# Patient Record
Sex: FEMALE | Race: WHITE | NOT HISPANIC OR LATINO
[De-identification: names, ages, dates, MRNs, and addresses within clinical notes are randomized per-mention and may not be internally consistent; named-entity substitution may affect disease eponyms.]

---

## 2019-08-19 ENCOUNTER — APPOINTMENT (OUTPATIENT)
Dept: MAMMOGRAPHY | Facility: IMAGING CENTER | Age: 44
End: 2019-08-19
Payer: COMMERCIAL

## 2019-08-19 ENCOUNTER — OUTPATIENT (OUTPATIENT)
Dept: OUTPATIENT SERVICES | Facility: HOSPITAL | Age: 44
LOS: 1 days | End: 2019-08-19
Payer: COMMERCIAL

## 2019-08-19 DIAGNOSIS — Z00.8 ENCOUNTER FOR OTHER GENERAL EXAMINATION: ICD-10-CM

## 2019-08-19 PROCEDURE — 77067 SCR MAMMO BI INCL CAD: CPT | Mod: 26

## 2019-08-19 PROCEDURE — 77067 SCR MAMMO BI INCL CAD: CPT

## 2019-08-19 PROCEDURE — 77063 BREAST TOMOSYNTHESIS BI: CPT

## 2019-08-19 PROCEDURE — 77063 BREAST TOMOSYNTHESIS BI: CPT | Mod: 26

## 2021-12-18 ENCOUNTER — TRANSCRIPTION ENCOUNTER (OUTPATIENT)
Age: 46
End: 2021-12-18

## 2022-09-28 ENCOUNTER — APPOINTMENT (OUTPATIENT)
Dept: MAMMOGRAPHY | Facility: CLINIC | Age: 47
End: 2022-09-28

## 2022-09-28 ENCOUNTER — OUTPATIENT (OUTPATIENT)
Dept: OUTPATIENT SERVICES | Facility: HOSPITAL | Age: 47
LOS: 1 days | End: 2022-09-28
Payer: COMMERCIAL

## 2022-09-28 DIAGNOSIS — Z00.00 ENCOUNTER FOR GENERAL ADULT MEDICAL EXAMINATION WITHOUT ABNORMAL FINDINGS: ICD-10-CM

## 2022-09-28 PROCEDURE — 77067 SCR MAMMO BI INCL CAD: CPT | Mod: 26

## 2022-09-28 PROCEDURE — 77063 BREAST TOMOSYNTHESIS BI: CPT | Mod: 26

## 2022-09-28 PROCEDURE — 77063 BREAST TOMOSYNTHESIS BI: CPT

## 2022-09-28 PROCEDURE — 77067 SCR MAMMO BI INCL CAD: CPT

## 2023-01-16 ENCOUNTER — APPOINTMENT (OUTPATIENT)
Dept: ULTRASOUND IMAGING | Facility: CLINIC | Age: 48
End: 2023-01-16
Payer: COMMERCIAL

## 2023-01-16 ENCOUNTER — OUTPATIENT (OUTPATIENT)
Dept: OUTPATIENT SERVICES | Facility: HOSPITAL | Age: 48
LOS: 1 days | End: 2023-01-16
Payer: COMMERCIAL

## 2023-01-16 DIAGNOSIS — Z00.8 ENCOUNTER FOR OTHER GENERAL EXAMINATION: ICD-10-CM

## 2023-01-16 DIAGNOSIS — Z00.00 ENCOUNTER FOR GENERAL ADULT MEDICAL EXAMINATION WITHOUT ABNORMAL FINDINGS: ICD-10-CM

## 2023-01-16 PROCEDURE — 76641 ULTRASOUND BREAST COMPLETE: CPT | Mod: 26,50

## 2023-01-16 PROCEDURE — 76641 ULTRASOUND BREAST COMPLETE: CPT

## 2024-06-06 ENCOUNTER — NON-APPOINTMENT (OUTPATIENT)
Age: 49
End: 2024-06-06

## 2024-06-10 ENCOUNTER — APPOINTMENT (OUTPATIENT)
Dept: ORTHOPEDIC SURGERY | Facility: CLINIC | Age: 49
End: 2024-06-10
Payer: COMMERCIAL

## 2024-06-10 VITALS
HEART RATE: 88 BPM | BODY MASS INDEX: 32.44 KG/M2 | WEIGHT: 219 LBS | SYSTOLIC BLOOD PRESSURE: 120 MMHG | DIASTOLIC BLOOD PRESSURE: 82 MMHG | HEIGHT: 69 IN

## 2024-06-10 PROCEDURE — 99203 OFFICE O/P NEW LOW 30 MIN: CPT

## 2024-06-10 PROCEDURE — 73564 X-RAY EXAM KNEE 4 OR MORE: CPT | Mod: RT

## 2024-06-10 RX ORDER — GLUC/MSM/COLGN2/HYAL/ANTIARTH3 375-375-20
TABLET ORAL
Refills: 0 | Status: ACTIVE | COMMUNITY

## 2024-06-11 NOTE — HISTORY OF PRESENT ILLNESS
[de-identified] : This patient presents for evaluation regarding a 1 year history of right knee pain.  Patient denies any injury.  She notes intermittent sharp pain about the knee.  Pain level varies from 2-6 out of 10.  Pain localized mostly to the medial joint line.  She has occasional buckling.  She denies any swelling.  She does note crepitations.  She does have meloxicam but she has not been taking it at this point.  She presents today with these complaints for evaluation.

## 2024-06-11 NOTE — DISCUSSION/SUMMARY
[de-identified] : This patient presents today for evaluation regarding right knee pain ongoing for the last year.  Her physical exam reveals evidence of moderate to severe osteoarthritis about the right knee.  I discussed the diagnosis of osteoarthritis with the patient as well as treatment recommendations and options.  She understands that she may require knee replacement surgery sometime in the future should symptoms worsen.  Due to her young age would like to be as conservative as possible with the treatment plan.  I recommend a course of viscosupplementation for the right knee.  Will request authorization from the insurance carrier and call patient with the medicines received in the office to begin treatment.  At least 30 minutes was spent performing the evaluation and management on today's office visit.  This includes but is not limited to preparing to see patient including review of any test results or outside medical records, obtaining and/or reviewing separately obtained history, performing examination and evaluation, counseling and educating the patient on their diagnosis and treatment recommendations, ordering medications, tests, or procedures, documenting clinical information in the electronic health record, independently interpreting results (not separately reported) and communicating results to the patient, and coordination of care.

## 2024-06-11 NOTE — PHYSICAL EXAM
[de-identified] : The patient appears well nourished  and in no apparent distress.  The patient is alert and oriented to person, place, and time.   Affect and mood appear normal.    The head is normocephalic and atraumatic.  The eyes reveal normal sclera and extra ocular muscles are intact.   The neck appears normal with no jugular venous distention or masses noted.   Skin shows normal turgor with no evidence of eczema or psoriasis.  No respiratory distress noted.  The patient ambulates with an antalgic gait.  The right knee has decreased range of motion from 0 to 120 degrees.  Pain noted with terminal flexion.  Tenderness noted about the medial joint to palpation.  Small effusion noted.  There is a negative Joanie sign.  There is no soft tissue swelling, warmth, or erythema.   There is a negative Lachman sign.  There is no instability to varus/valgus stress.  There is no instability to anterior/posterior drawer.  There is normal strength  in the quadriceps and hamstring muscles.  Strength and sensation are intact distally.  There are normal pulses distally and good capillary refill.  No edema or lymphadenopathy noted.   [de-identified] : AP, lateral, tunnel, and merchant views of the right knee were obtained.  There is moderate to severe medial joint narrowing.  There is moderate patellofemoral narrowing.  The alignment of the knee is normal.  No fractures or dislocations are noted.

## 2024-06-20 RX ORDER — HYALURONATE SODIUM 20 MG/2 ML
20 SYRINGE (ML) INTRAARTICULAR
Qty: 3 | Refills: 0 | Status: ACTIVE | OUTPATIENT
Start: 2024-06-11

## 2024-06-26 ENCOUNTER — APPOINTMENT (OUTPATIENT)
Dept: ORTHOPEDIC SURGERY | Facility: CLINIC | Age: 49
End: 2024-06-26
Payer: COMMERCIAL

## 2024-06-26 DIAGNOSIS — M17.11 UNILATERAL PRIMARY OSTEOARTHRITIS, RIGHT KNEE: ICD-10-CM

## 2024-06-26 PROCEDURE — 20611 DRAIN/INJ JOINT/BURSA W/US: CPT | Mod: RT

## 2024-06-26 RX ORDER — HYALURONATE SODIUM 20 MG/2 ML
20 SYRINGE (ML) INTRAARTICULAR
Refills: 0 | Status: COMPLETED | OUTPATIENT
Start: 2024-06-26

## 2024-06-26 RX ADMIN — Medication 2 MG/2ML: at 00:00

## 2024-07-03 ENCOUNTER — APPOINTMENT (OUTPATIENT)
Dept: ORTHOPEDIC SURGERY | Facility: CLINIC | Age: 49
End: 2024-07-03
Payer: COMMERCIAL

## 2024-07-03 VITALS — SYSTOLIC BLOOD PRESSURE: 121 MMHG | DIASTOLIC BLOOD PRESSURE: 79 MMHG | HEART RATE: 80 BPM

## 2024-07-03 PROCEDURE — 20611 DRAIN/INJ JOINT/BURSA W/US: CPT | Mod: RT

## 2024-07-03 RX ORDER — HYALURONATE SODIUM 20 MG/2 ML
20 SYRINGE (ML) INTRAARTICULAR
Refills: 0 | Status: COMPLETED | OUTPATIENT
Start: 2024-07-03

## 2024-07-03 RX ADMIN — Medication 0 MG/2ML: at 00:00

## 2024-07-10 ENCOUNTER — APPOINTMENT (OUTPATIENT)
Dept: ORTHOPEDIC SURGERY | Facility: CLINIC | Age: 49
End: 2024-07-10
Payer: COMMERCIAL

## 2024-07-10 VITALS — HEART RATE: 83 BPM | DIASTOLIC BLOOD PRESSURE: 77 MMHG | SYSTOLIC BLOOD PRESSURE: 117 MMHG

## 2024-07-10 DIAGNOSIS — M17.11 UNILATERAL PRIMARY OSTEOARTHRITIS, RIGHT KNEE: ICD-10-CM

## 2024-07-10 PROCEDURE — 20611 DRAIN/INJ JOINT/BURSA W/US: CPT | Mod: RT

## 2024-07-10 RX ORDER — HYALURONATE SODIUM 20 MG/2 ML
20 SYRINGE (ML) INTRAARTICULAR
Refills: 0 | Status: COMPLETED | OUTPATIENT
Start: 2024-07-10

## 2024-07-10 RX ADMIN — Medication 2 MG/2ML: at 00:00

## 2024-10-23 ENCOUNTER — APPOINTMENT (OUTPATIENT)
Dept: ORTHOPEDIC SURGERY | Facility: CLINIC | Age: 49
End: 2024-10-23
Payer: COMMERCIAL

## 2024-10-23 DIAGNOSIS — M17.11 UNILATERAL PRIMARY OSTEOARTHRITIS, RIGHT KNEE: ICD-10-CM

## 2024-10-23 PROCEDURE — 99214 OFFICE O/P EST MOD 30 MIN: CPT

## 2025-03-10 ENCOUNTER — APPOINTMENT (OUTPATIENT)
Dept: ORTHOPEDIC SURGERY | Facility: CLINIC | Age: 50
End: 2025-03-10

## 2025-03-10 ENCOUNTER — APPOINTMENT (OUTPATIENT)
Dept: ORTHOPEDIC SURGERY | Facility: CLINIC | Age: 50
End: 2025-03-10
Payer: COMMERCIAL

## 2025-03-10 DIAGNOSIS — M17.11 UNILATERAL PRIMARY OSTEOARTHRITIS, RIGHT KNEE: ICD-10-CM

## 2025-03-10 PROCEDURE — 99214 OFFICE O/P EST MOD 30 MIN: CPT

## 2025-03-10 PROCEDURE — 73564 X-RAY EXAM KNEE 4 OR MORE: CPT | Mod: RT

## 2025-03-12 ENCOUNTER — APPOINTMENT (OUTPATIENT)
Dept: ORTHOPEDIC SURGERY | Facility: CLINIC | Age: 50
End: 2025-03-12

## 2025-03-20 ENCOUNTER — TRANSCRIPTION ENCOUNTER (OUTPATIENT)
Age: 50
End: 2025-03-20

## 2025-03-21 ENCOUNTER — OUTPATIENT (OUTPATIENT)
Dept: OUTPATIENT SERVICES | Facility: HOSPITAL | Age: 50
LOS: 1 days | End: 2025-03-21
Payer: COMMERCIAL

## 2025-03-21 VITALS
DIASTOLIC BLOOD PRESSURE: 66 MMHG | HEIGHT: 69 IN | SYSTOLIC BLOOD PRESSURE: 134 MMHG | RESPIRATION RATE: 18 BRPM | OXYGEN SATURATION: 97 % | WEIGHT: 220.02 LBS | TEMPERATURE: 98 F | HEART RATE: 82 BPM

## 2025-03-21 DIAGNOSIS — Z98.891 HISTORY OF UTERINE SCAR FROM PREVIOUS SURGERY: Chronic | ICD-10-CM

## 2025-03-21 DIAGNOSIS — M17.11 UNILATERAL PRIMARY OSTEOARTHRITIS, RIGHT KNEE: ICD-10-CM

## 2025-03-21 DIAGNOSIS — Z01.818 ENCOUNTER FOR OTHER PREPROCEDURAL EXAMINATION: ICD-10-CM

## 2025-03-21 LAB
A1C WITH ESTIMATED AVERAGE GLUCOSE RESULT: 5.2 % — SIGNIFICANT CHANGE UP (ref 4–5.6)
ALBUMIN SERPL ELPH-MCNC: 3.3 G/DL — SIGNIFICANT CHANGE UP (ref 3.3–5)
ALP SERPL-CCNC: 73 U/L — SIGNIFICANT CHANGE UP (ref 30–120)
ALT FLD-CCNC: 22 U/L — SIGNIFICANT CHANGE UP (ref 10–60)
ANION GAP SERPL CALC-SCNC: 7 MMOL/L — SIGNIFICANT CHANGE UP (ref 5–17)
APTT BLD: 32.1 SEC — SIGNIFICANT CHANGE UP (ref 24.5–35.6)
AST SERPL-CCNC: 13 U/L — SIGNIFICANT CHANGE UP (ref 10–40)
BILIRUB SERPL-MCNC: 0.2 MG/DL — SIGNIFICANT CHANGE UP (ref 0.2–1.2)
BLD GP AB SCN SERPL QL: SIGNIFICANT CHANGE UP
BUN SERPL-MCNC: 19 MG/DL — SIGNIFICANT CHANGE UP (ref 7–23)
CALCIUM SERPL-MCNC: 8.8 MG/DL — SIGNIFICANT CHANGE UP (ref 8.4–10.5)
CHLORIDE SERPL-SCNC: 107 MMOL/L — SIGNIFICANT CHANGE UP (ref 96–108)
CO2 SERPL-SCNC: 30 MMOL/L — SIGNIFICANT CHANGE UP (ref 22–31)
CREAT SERPL-MCNC: 0.94 MG/DL — SIGNIFICANT CHANGE UP (ref 0.5–1.3)
EGFR: 75 ML/MIN/1.73M2 — SIGNIFICANT CHANGE UP
ESTIMATED AVERAGE GLUCOSE: 103 MG/DL — SIGNIFICANT CHANGE UP (ref 68–114)
GLUCOSE SERPL-MCNC: 92 MG/DL — SIGNIFICANT CHANGE UP (ref 70–99)
HCT VFR BLD CALC: 45.7 % — HIGH (ref 34.5–45)
HGB BLD-MCNC: 14.5 G/DL — SIGNIFICANT CHANGE UP (ref 11.5–15.5)
INR BLD: 0.91 RATIO — SIGNIFICANT CHANGE UP (ref 0.85–1.16)
MCHC RBC-ENTMCNC: 30.4 PG — SIGNIFICANT CHANGE UP (ref 27–34)
MCHC RBC-ENTMCNC: 31.7 G/DL — LOW (ref 32–36)
MCV RBC AUTO: 95.8 FL — SIGNIFICANT CHANGE UP (ref 80–100)
MRSA PCR RESULT.: SIGNIFICANT CHANGE UP
NRBC BLD AUTO-RTO: 0 /100 WBCS — SIGNIFICANT CHANGE UP (ref 0–0)
PLATELET # BLD AUTO: 276 K/UL — SIGNIFICANT CHANGE UP (ref 150–400)
POTASSIUM SERPL-MCNC: 3.9 MMOL/L — SIGNIFICANT CHANGE UP (ref 3.5–5.3)
POTASSIUM SERPL-SCNC: 3.9 MMOL/L — SIGNIFICANT CHANGE UP (ref 3.5–5.3)
PROT SERPL-MCNC: 6.9 G/DL — SIGNIFICANT CHANGE UP (ref 6–8.3)
PROTHROM AB SERPL-ACNC: 10.7 SEC — SIGNIFICANT CHANGE UP (ref 9.9–13.4)
RBC # BLD: 4.77 M/UL — SIGNIFICANT CHANGE UP (ref 3.8–5.2)
RBC # FLD: 13.8 % — SIGNIFICANT CHANGE UP (ref 10.3–14.5)
SODIUM SERPL-SCNC: 144 MMOL/L — SIGNIFICANT CHANGE UP (ref 135–145)
WBC # BLD: 8.67 K/UL — SIGNIFICANT CHANGE UP (ref 3.8–10.5)
WBC # FLD AUTO: 8.67 K/UL — SIGNIFICANT CHANGE UP (ref 3.8–10.5)

## 2025-03-21 PROCEDURE — 85610 PROTHROMBIN TIME: CPT

## 2025-03-21 PROCEDURE — 86850 RBC ANTIBODY SCREEN: CPT

## 2025-03-21 PROCEDURE — 87641 MR-STAPH DNA AMP PROBE: CPT

## 2025-03-21 PROCEDURE — 93005 ELECTROCARDIOGRAM TRACING: CPT

## 2025-03-21 PROCEDURE — 85027 COMPLETE CBC AUTOMATED: CPT

## 2025-03-21 PROCEDURE — 83036 HEMOGLOBIN GLYCOSYLATED A1C: CPT

## 2025-03-21 PROCEDURE — 93010 ELECTROCARDIOGRAM REPORT: CPT

## 2025-03-21 PROCEDURE — 85730 THROMBOPLASTIN TIME PARTIAL: CPT

## 2025-03-21 PROCEDURE — G0463: CPT

## 2025-03-21 PROCEDURE — 86900 BLOOD TYPING SEROLOGIC ABO: CPT

## 2025-03-21 PROCEDURE — 87640 STAPH A DNA AMP PROBE: CPT

## 2025-03-21 PROCEDURE — 86901 BLOOD TYPING SEROLOGIC RH(D): CPT

## 2025-03-21 PROCEDURE — 36415 COLL VENOUS BLD VENIPUNCTURE: CPT

## 2025-03-21 PROCEDURE — 80053 COMPREHEN METABOLIC PANEL: CPT

## 2025-03-21 NOTE — H&P PST ADULT - ASSESSMENT
49 year old female with hx of iron deficiency anemia. Scheduled for a right total knee replacement on 4/3/25

## 2025-03-21 NOTE — H&P PST ADULT - PROBLEM SELECTOR PLAN 1
Scheduled for right total knee replacement on 4/3/25  Pre op instructions given   NPO after midnight   Stop Naltrexone, Bupropion, Metformin, vitamins and ibuprofen 1 week before surgery  Chlorhexidine wash given   Medical clearance needed  UCG on admit

## 2025-03-21 NOTE — H&P PST ADULT - NSANTHOSAYNRD_GEN_A_CORE
No. CAMELIA screening performed.  STOP BANG Legend: 0-2 = LOW Risk; 3-4 = INTERMEDIATE Risk; 5-8 = HIGH Risk

## 2025-03-21 NOTE — H&P PST ADULT - HISTORY OF PRESENT ILLNESS
49 year old female presents with right knee pain. Pain started a few years ago, pain is worse walking and has been getting worse recently. Patient tried injections which did not help, patient takes Meloxicam as well which does not help. Patient states pain is a 7/10 when she is walking. Patient is scheduled for a right total knee replacement on 4/3/25

## 2025-03-24 RX ORDER — MUPIROCIN CALCIUM 20 MG/G
1 CREAM TOPICAL
Qty: 1 | Refills: 0
Start: 2025-03-24 | End: 2025-03-28

## 2025-03-24 NOTE — PROGRESS NOTE ADULT - SUBJECTIVE AND OBJECTIVE BOX
Nose culture : MSSA detected  Escribed mupirocin nasal ointment BID for 5 days , Left message for patient to call back

## 2025-04-01 RX ORDER — TRANEXAMIC ACID 1000 MG/10
1000 AMPUL (ML) INTRAVENOUS ONCE
Refills: 0 | Status: COMPLETED | OUTPATIENT
Start: 2025-04-03 | End: 2025-04-03

## 2025-04-01 RX ORDER — CEFAZOLIN SODIUM IN 0.9 % NACL 3 G/100 ML
2000 INTRAVENOUS SOLUTION, PIGGYBACK (ML) INTRAVENOUS ONCE
Refills: 0 | Status: COMPLETED | OUTPATIENT
Start: 2025-04-03 | End: 2025-04-03

## 2025-04-01 RX ORDER — APREPITANT 40 MG/1
40 CAPSULE ORAL ONCE
Refills: 0 | Status: COMPLETED | OUTPATIENT
Start: 2025-04-03 | End: 2025-04-03

## 2025-04-01 RX ORDER — ACETAMINOPHEN 500 MG/5ML
1000 LIQUID (ML) ORAL ONCE
Refills: 0 | Status: COMPLETED | OUTPATIENT
Start: 2025-04-03 | End: 2025-04-03

## 2025-04-03 ENCOUNTER — TRANSCRIPTION ENCOUNTER (OUTPATIENT)
Age: 50
End: 2025-04-03

## 2025-04-03 ENCOUNTER — APPOINTMENT (OUTPATIENT)
Dept: ORTHOPEDIC SURGERY | Facility: HOSPITAL | Age: 50
End: 2025-04-03

## 2025-04-03 ENCOUNTER — OUTPATIENT (OUTPATIENT)
Dept: INPATIENT UNIT | Facility: HOSPITAL | Age: 50
LOS: 1 days | End: 2025-04-03
Payer: COMMERCIAL

## 2025-04-03 VITALS
HEIGHT: 69 IN | HEART RATE: 83 BPM | RESPIRATION RATE: 15 BRPM | DIASTOLIC BLOOD PRESSURE: 71 MMHG | TEMPERATURE: 98 F | WEIGHT: 222.01 LBS | OXYGEN SATURATION: 97 % | SYSTOLIC BLOOD PRESSURE: 133 MMHG

## 2025-04-03 DIAGNOSIS — M17.11 UNILATERAL PRIMARY OSTEOARTHRITIS, RIGHT KNEE: ICD-10-CM

## 2025-04-03 DIAGNOSIS — Z98.891 HISTORY OF UTERINE SCAR FROM PREVIOUS SURGERY: Chronic | ICD-10-CM

## 2025-04-03 LAB
ABO RH CONFIRMATION: SIGNIFICANT CHANGE UP
HCG UR QL: NEGATIVE — SIGNIFICANT CHANGE UP

## 2025-04-03 DEVICE — IMPLANTABLE DEVICE: Type: IMPLANTABLE DEVICE | Site: RIGHT | Status: FUNCTIONAL

## 2025-04-03 DEVICE — SCREW HEX HEADED 3.5X48MM: Type: IMPLANTABLE DEVICE | Site: RIGHT | Status: FUNCTIONAL

## 2025-04-03 DEVICE — ZIMMER/NEXGEN SMOOTH PIN 3.2X75MM: Type: IMPLANTABLE DEVICE | Site: RIGHT | Status: FUNCTIONAL

## 2025-04-03 DEVICE — FEM PERSONA CEMENT NARROW SZ 8 R: Type: IMPLANTABLE DEVICE | Site: RIGHT | Status: FUNCTIONAL

## 2025-04-03 DEVICE — SURF ART PERSONA RT 6-9 EF 12MM: Type: IMPLANTABLE DEVICE | Site: RIGHT | Status: FUNCTIONAL

## 2025-04-03 DEVICE — PATELLA VE 29MM: Type: IMPLANTABLE DEVICE | Site: RIGHT | Status: FUNCTIONAL

## 2025-04-03 DEVICE — ZIMMER FEMALE HEX SCREW MAGNETIC 2.5MM X 25MM: Type: IMPLANTABLE DEVICE | Site: RIGHT | Status: FUNCTIONAL

## 2025-04-03 RX ORDER — ACETAMINOPHEN 500 MG/5ML
1000 LIQUID (ML) ORAL EVERY 6 HOURS
Refills: 0 | Status: COMPLETED | OUTPATIENT
Start: 2025-04-03 | End: 2025-04-03

## 2025-04-03 RX ORDER — ASPIRIN 325 MG
81 TABLET ORAL EVERY 12 HOURS
Refills: 0 | Status: DISCONTINUED | OUTPATIENT
Start: 2025-04-04 | End: 2025-04-04

## 2025-04-03 RX ORDER — HYDROMORPHONE/SOD CHLOR,ISO/PF 2 MG/10 ML
0.5 SYRINGE (ML) INJECTION
Refills: 0 | Status: DISCONTINUED | OUTPATIENT
Start: 2025-04-03 | End: 2025-04-03

## 2025-04-03 RX ORDER — HYDROMORPHONE/SOD CHLOR,ISO/PF 2 MG/10 ML
2 SYRINGE (ML) INJECTION
Refills: 0 | Status: DISCONTINUED | OUTPATIENT
Start: 2025-04-03 | End: 2025-04-04

## 2025-04-03 RX ORDER — SENNA 187 MG
2 TABLET ORAL AT BEDTIME
Refills: 0 | Status: DISCONTINUED | OUTPATIENT
Start: 2025-04-03 | End: 2025-04-04

## 2025-04-03 RX ORDER — HYDROMORPHONE/SOD CHLOR,ISO/PF 2 MG/10 ML
0.5 SYRINGE (ML) INJECTION
Refills: 0 | Status: DISCONTINUED | OUTPATIENT
Start: 2025-04-03 | End: 2025-04-04

## 2025-04-03 RX ORDER — ONDANSETRON HCL/PF 4 MG/2 ML
4 VIAL (ML) INJECTION ONCE
Refills: 0 | Status: DISCONTINUED | OUTPATIENT
Start: 2025-04-03 | End: 2025-04-03

## 2025-04-03 RX ORDER — MELOXICAM 15 MG/1
15 TABLET ORAL DAILY
Refills: 0 | Status: DISCONTINUED | OUTPATIENT
Start: 2025-04-03 | End: 2025-04-04

## 2025-04-03 RX ORDER — METFORMIN HYDROCHLORIDE 850 MG/1
1 TABLET ORAL
Refills: 0 | DISCHARGE

## 2025-04-03 RX ORDER — SODIUM CHLORIDE 9 G/1000ML
1000 INJECTION, SOLUTION INTRAVENOUS
Refills: 0 | Status: DISCONTINUED | OUTPATIENT
Start: 2025-04-03 | End: 2025-04-04

## 2025-04-03 RX ORDER — POLYETHYLENE GLYCOL 3350 17 G/17G
17 POWDER, FOR SOLUTION ORAL AT BEDTIME
Refills: 0 | Status: DISCONTINUED | OUTPATIENT
Start: 2025-04-03 | End: 2025-04-04

## 2025-04-03 RX ORDER — NORGESTIMATE AND ETHINYL ESTRADIOL 7DAYSX3 28
1 KIT ORAL
Refills: 0 | DISCHARGE

## 2025-04-03 RX ORDER — MAGNESIUM HYDROXIDE 400 MG/5ML
30 SUSPENSION ORAL DAILY
Refills: 0 | Status: DISCONTINUED | OUTPATIENT
Start: 2025-04-03 | End: 2025-04-04

## 2025-04-03 RX ORDER — FERROUS SULFATE 137(45) MG
1 TABLET, EXTENDED RELEASE ORAL
Refills: 0 | DISCHARGE

## 2025-04-03 RX ORDER — SODIUM CHLORIDE 9 G/1000ML
1000 INJECTION, SOLUTION INTRAVENOUS
Refills: 0 | Status: DISCONTINUED | OUTPATIENT
Start: 2025-04-03 | End: 2025-04-03

## 2025-04-03 RX ORDER — HYDROMORPHONE/SOD CHLOR,ISO/PF 2 MG/10 ML
4 SYRINGE (ML) INJECTION
Refills: 0 | Status: DISCONTINUED | OUTPATIENT
Start: 2025-04-03 | End: 2025-04-04

## 2025-04-03 RX ORDER — ACETAMINOPHEN 500 MG/5ML
1000 LIQUID (ML) ORAL EVERY 8 HOURS
Refills: 0 | Status: DISCONTINUED | OUTPATIENT
Start: 2025-04-04 | End: 2025-04-04

## 2025-04-03 RX ORDER — BISACODYL 5 MG
10 TABLET, DELAYED RELEASE (ENTERIC COATED) ORAL ONCE
Refills: 0 | Status: DISCONTINUED | OUTPATIENT
Start: 2025-04-05 | End: 2025-04-04

## 2025-04-03 RX ORDER — BUPROPION HYDROBROMIDE 522 MG/1
1 TABLET, EXTENDED RELEASE ORAL
Refills: 0 | DISCHARGE

## 2025-04-03 RX ORDER — DEXAMETHASONE 0.5 MG/1
8 TABLET ORAL ONCE
Refills: 0 | Status: COMPLETED | OUTPATIENT
Start: 2025-04-04 | End: 2025-04-04

## 2025-04-03 RX ORDER — CEFAZOLIN SODIUM IN 0.9 % NACL 3 G/100 ML
2000 INTRAVENOUS SOLUTION, PIGGYBACK (ML) INTRAVENOUS EVERY 8 HOURS
Refills: 0 | Status: COMPLETED | OUTPATIENT
Start: 2025-04-03 | End: 2025-04-04

## 2025-04-03 RX ORDER — HYDROMORPHONE/SOD CHLOR,ISO/PF 2 MG/10 ML
0.25 SYRINGE (ML) INJECTION
Refills: 0 | Status: DISCONTINUED | OUTPATIENT
Start: 2025-04-03 | End: 2025-04-03

## 2025-04-03 RX ORDER — ONDANSETRON HCL/PF 4 MG/2 ML
4 VIAL (ML) INJECTION EVERY 6 HOURS
Refills: 0 | Status: DISCONTINUED | OUTPATIENT
Start: 2025-04-03 | End: 2025-04-04

## 2025-04-03 RX ADMIN — SODIUM CHLORIDE 75 MILLILITER(S): 9 INJECTION, SOLUTION INTRAVENOUS at 13:55

## 2025-04-03 RX ADMIN — Medication 1 APPLICATION(S): at 09:03

## 2025-04-03 RX ADMIN — Medication 2 MILLIGRAM(S): at 16:00

## 2025-04-03 RX ADMIN — Medication 500 MILLILITER(S): at 13:55

## 2025-04-03 RX ADMIN — Medication 1000 MILLIGRAM(S): at 23:46

## 2025-04-03 RX ADMIN — Medication 2 TABLET(S): at 21:36

## 2025-04-03 RX ADMIN — Medication 100 MILLIGRAM(S): at 18:55

## 2025-04-03 RX ADMIN — Medication 2 MILLIGRAM(S): at 22:30

## 2025-04-03 RX ADMIN — Medication 400 MILLIGRAM(S): at 17:58

## 2025-04-03 RX ADMIN — Medication 2 MILLIGRAM(S): at 21:36

## 2025-04-03 RX ADMIN — SODIUM CHLORIDE 100 MILLILITER(S): 9 INJECTION, SOLUTION INTRAVENOUS at 23:35

## 2025-04-03 RX ADMIN — Medication 400 MILLIGRAM(S): at 23:45

## 2025-04-03 RX ADMIN — Medication 500 MILLILITER(S): at 21:36

## 2025-04-03 RX ADMIN — APREPITANT 40 MILLIGRAM(S): 40 CAPSULE ORAL at 09:03

## 2025-04-03 RX ADMIN — Medication 1000 MILLIGRAM(S): at 18:02

## 2025-04-03 RX ADMIN — SODIUM CHLORIDE 100 MILLILITER(S): 9 INJECTION, SOLUTION INTRAVENOUS at 18:04

## 2025-04-03 RX ADMIN — Medication 2 MILLIGRAM(S): at 15:13

## 2025-04-03 NOTE — CONSULT NOTE ADULT - ASSESSMENT
49F s/p right TKR    OA right knee s/p TKR  Pain Management: acceptable- continue current care Tylenol ATC/Mobic ATC/ Oxycodone PRN  Continue PT/OT  DVT proph: [x  ] low risk - Aspirin    DC plan:  [x  ] Home with HC      Obesity  on mult meds to assist in weight loss  - can restart wellbutrin, metformin  - hold Naltrexone while on opioids, can resume when no longer requires oxy    Iron def anemia  - has been on oral iron which causes her significant constipation  - will check iron levels today, if low or even low normal can give a dose IV prior to discharge so patient does not have to take oral in the post op period and have worse constipation

## 2025-04-03 NOTE — DISCHARGE NOTE PROVIDER - NSDCFUADDINST_GEN_ALL_CORE_FT
For Constipation :   • Increase your water intake. Drink at least 8 glasses of water daily.  • Try adding fiber to your diet by eating fruits, vegetables and foods that are rich in grains.  • If you do experience constipation, you may take an over-the-counter stool softener/laxative such as Hue Colace, Senekot or Milk of Magnesia.  - Call your doctor if you experience:  • An increase in pain not controlled by pain medication or change in activity or  position.  • Temperature greater than 101° F.  • Redness, increased swelling or foul smelling drainage from or around the  incision.  • Numbness, tingling or a change in color or temperature of the operative leg.  • Call your doctor immediately if you experience chest pain, shortness of breath or calf pain.

## 2025-04-03 NOTE — DISCHARGE NOTE PROVIDER - CARE PROVIDER_API CALL
Willie Chery  Orthopaedic Surgery  [address]  [place] [address]  Phone: [phone]  Fax: [phone]  Follow Up Time:    Willie Chery  Orthopaedic Surgery  [address]  [place] [address]  Phone: [phone]  Fax: [phone]  Scheduled Appointment: 04/14/2025 10:00 AM

## 2025-04-03 NOTE — DISCHARGE NOTE PROVIDER - NSDCFUSCHEDAPPT_GEN_ALL_CORE_FT
Willie Chery  [facility]  ORTHOSURG [address]  Scheduled Appointment: 04/14/2025

## 2025-04-03 NOTE — CONSULT NOTE ADULT - SUBJECTIVE AND OBJECTIVE BOX
Patient is a 49y old  Female who presents with a chief complaint of Right total knee replacement (2025 13:37)      HPI:  49F presented with right knee pain. Pain started a few years ago, pain is worse walking and has been getting worse recently. Patient tried injections which did not help, patient takes Meloxicam as well which does not help. Patient states pain is a 7/10 when she is walking.  now s/p right TKR.  doing well feeling great.     REVIEW OF SYSTEMS:  CONSTITUTIONAL: No fever, weight loss, or fatigue  EYES: No eye pain, visual disturbances, or discharge  ENMT:  No difficulty hearing, tinnitus, vertigo; No sinus or throat pain  NECK: No pain or stiffness  BREASTS: No pain, masses, or nipple discharge  RESPIRATORY: No cough, wheezing, chills or hemoptysis; No shortness of breath  CARDIOVASCULAR: No chest pain, palpitations, dizziness, or leg swelling  GASTROINTESTINAL: No abdominal or epigastric pain. No nausea, vomiting, or hematemesis; No diarrhea or constipation. No melena or hematochezia.  GENITOURINARY: No dysuria, frequency, hematuria, or incontinence  NEUROLOGICAL: No headaches, memory loss, loss of strength, numbness, or tremors  SKIN: No itching, burning, rashes, or lesions   LYMPH NODES: No enlarged glands  ENDOCRINE: No heat or cold intolerance; No hair loss  MUSCULOSKELETAL: No muscle or back pain  PSYCHIATRIC: No depression, anxiety, mood swings, or difficulty sleeping  HEME/LYMPH: No easy bruising, or bleeding gums  ALLERGY AND IMMUNOLOGIC: No hives or eczema    PAST MEDICAL & SURGICAL HISTORY:  Osteoarthritis of right knee    Iron deficiency anemia    H/O  section        FAMILY HISTORY:  FH: breast cancer (Aunt)    FH: heart disease (Mother, Grandparent)    FH: hypertension (Father)        SOCIAL HISTORY:  Residence: [ ] [facility]  [ ] [facility]  [x ] Community  [ ] Substance abuse: denies  [ ] Tobacco: quit   [ ] Alcohol use: wine a few times a week    Allergies  No Known Allergies        MEDICATIONS  (STANDING):  lactated ringers. 1000 milliLiter(s) (75 mL/Hr) IV Continuous <Continuous>    MEDICATIONS  (PRN):  HYDROmorphone  Injectable 0.25 milliGRAM(s) IV Push every 10 minutes PRN Moderate Pain (4 - 6)  HYDROmorphone  Injectable 0.5 milliGRAM(s) IV Push every 10 minutes PRN Severe Pain (7 - 10)  ondansetron Injectable 4 milliGRAM(s) IV Push once PRN Nausea and/or Vomiting        Vital Signs Last 24 Hrs  T(C): 36.3 (2025 13:20), Max: 36.6 (2025 08:55)  T(F): 97.3 (2025 13:20), Max: 97.8 (2025 08:55)  HR: 79 (2025 14:30) (73 - 88)  BP: 135/83 (2025 14:30) (106/68 - 157/72)  BP(mean): --  RR: 20 (2025 14:30) (15 - 20)  SpO2: 100% (2025 14:30) (97% - 100%)    Parameters below as of 2025 13:20  Patient On (Oxygen Delivery Method): room air        PHYSICAL EXAM:    GENERAL: NAD, well-groomed, well-developed  HEAD:  Atraumatic, Normocephalic  EYES:  conjunctiva and sclera clear  ENMT:  Moist mucous membranes  NECK: Supple, No JVD  NERVOUS SYSTEM:  Alert & Oriented X3, Good concentration; Moving all 4 extremities; No gross sensory deficits  CHEST/LUNG: Clear to auscultation bilaterally;   HEART: Regular rate and rhythm;   ABDOMEN: Soft, Nontender, Nondistended; Bowel sounds present  EXTREMITIES:  2+ Peripheral Pulses, No clubbing, cyanosis, or edema  LYMPH: No lymphadenopathy noted  /RECTAL: Not examined  BREAST: Not examined  SKIN: No rashes or lesions  INCISION: dressing dry and intact    LABS:              CAPILLARY BLOOD GLUCOSE          RADIOLOGY & ADDITIONAL STUDIES:    EKG:  NSR  Personally Reviewed:  [ x] YES     Imaging: TKR in place  Personally Reviewed:  [x ] YES     Consultant(s) Notes Reviewed:  preop med clearance reviewed    Care Discussed with Consultants/Other Providers:

## 2025-04-03 NOTE — DISCHARGE NOTE PROVIDER - NSDCMRMEDTOKEN_GEN_ALL_CORE_FT
buPROPion 150 mg/24 hours (XL) oral tablet, extended release: 1 tab(s) orally 2 times a day  ferrous sulfate: 1 dose(s) once a day 65mg  meloxicam 15 mg oral tablet: 1 tab(s) orally once a day  metFORMIN 500 mg oral tablet, extended release: 1 tab(s) orally once a day  mupirocin 2% topical ointment: 1 application in each nostril 2 times a day  naltrexone: 1 dose(s) once a day Naltrexone 14mg Topiramate 44mg Vitamin B12 1mg  norgestimate-ethinyl estradiol (0.18 mg-0.025 mg)-(0.215 mg-0.025 mg)-(0.25 mg-0.025 mg) oral tablet: 1 tab(s) orally once a day   acetaminophen 500 mg oral tablet: 2 tab(s) orally every 8 hours  aspirin 81 mg oral delayed release tablet: 1 tab(s) orally every 12 hours take 2 hours before Celebrex/Meloxicam  buPROPion 150 mg/24 hours (XL) oral tablet, extended release: 1 tab(s) orally 2 times a day  ferrous sulfate: 1 dose(s) once a day 65mg  HYDROmorphone 2 mg oral tablet: 1 tab(s) orally every 4 hours as needed for  moderate pain may take 2 tabs as needed for severe pain. Do Not combine with other sedating medications. Do Not Exceed Max Daily dose of 6 tabs. [facility] :[id number] MDD: 6  meloxicam 15 mg oral tablet: 1 tab(s) orally once a day take 2 hours after Aspirin  metFORMIN 500 mg oral tablet, extended release: 1 tab(s) orally once a day  naltrexone: 1 dose(s) once a day Naltrexone 14mg Topiramate 44mg Vitamin B12 1mg, DO NOT take while taking Narcotics (hydromorphone)  norgestimate-ethinyl estradiol (0.18 mg-0.025 mg)-(0.215 mg-0.025 mg)-(0.25 mg-0.025 mg) oral tablet: 1 tab(s) orally once a day  omeprazole 20 mg oral delayed release capsule: 1 cap(s) orally once a day prior to a meal (breakfast)

## 2025-04-03 NOTE — DISCHARGE NOTE PROVIDER - NSDCFUADDAPPT_GEN_ALL_CORE_FT
Call office to set up an appt to be seen in approx 2 weeks    It is advised that you follow up with your PCP within 2-3 weeks of discharge home from the hospital

## 2025-04-03 NOTE — DISCHARGE NOTE PROVIDER - PROVIDER TOKENS
PROVIDER:[TOKEN:[[id number]]] PROVIDER:[TOKEN:[[id number]],SCHEDULEDAPPT:[04/14/2025],SCHEDULEDAPPTTIME:[10:00 AM]]

## 2025-04-03 NOTE — BRIEF OPERATIVE NOTE - NSICDXBRIEFPROCEDURE_GEN_ALL_CORE_FT
Spoke to patient to give results : per dr whitten Cholesterol levels look good, continue on the higher simvastatin dose.    PROCEDURES:  Right total knee replacement 03-Apr-2025 13:34:38  Yuliana Heredia

## 2025-04-03 NOTE — DISCHARGE NOTE PROVIDER - HOSPITAL COURSE
This patient was admitted to [facility] with a history of severe degenerative joint disease of the right knee.  Patient went to Pre-Surgical Testing at [facility] and was medically cleared to undergo a right total knee replacement by Dr. Chery on 4/3/25.  No operative or anthony-operative complications arose during patients hospital course.  Patient received antibiotic according to SCIP guidelines for infection prevention.  Aspirin was given for DVT prophylaxis.  Anesthesia, Medical Hospitalist, Physical Therapy and Occupational Therapy were consulted. Patient is stable for discharge with a good prognosis.  Appropriate discharge instructions and medications are provided in this document. This patient was admitted to [facility] with a history of severe degenerative joint disease of the right knee.  Patient went to Pre-Surgical Testing at [facility] and was medically cleared to undergo a right total knee replacement by Dr. Chery on 4/3/25.  No operative or anthony-operative complications arose during patients hospital course.  Patient received antibiotic according to SCIP guidelines for infection prevention.  Aspirin was given for DVT prophylaxis.  Anesthesia, Medical Hospitalist, Physical Therapy and Occupational Therapy were consulted. Patient is stable for discharge with a good prognosis.  Appropriate discharge instructions and medications are provided in this document. Patient is going home with home PT.

## 2025-04-03 NOTE — DISCHARGE NOTE PROVIDER - NSDCCPCAREPLAN_GEN_ALL_CORE_FT
PRINCIPAL DISCHARGE DIAGNOSIS  Diagnosis: Primary osteoarthritis of right knee  Assessment and Plan of Treatment: Physical Therapy/Occupational Therapy for: ambulation, transfers, stairs, ADL's (activities of daily living), and range of motion exercises  -Activity  • Weight Bearing as tolerated with rolling walker.  • Take short, frequent walks increasing the distance that you walk each day as tolerated.  • Change your position every hour to decrease pain and stiffness.  • Continue the exercises taught to you by your physical therapist.  • No driving until cleared by the doctor.  • No tub baths, hot tubs, or swimming pools until instructed by your doctor.  • Do not squat down on the floor.  • Do not kneel or twist your knee.  • Range of Motion Goals: Flexion= 120 degrees, Extension = 0 degrees  Ice & Elevate leg to decrease pain/swelling  You have a SHERRY dressing. You may shower. Disconnect SHERRY battery prior to showering. Reconnect battery after showering and press orange button to resume SHERRY power. Remove SHERRY dressing on post-op day #7.  Keep incision clean. DO NOT APPLY ANYTHING to incision site (salves/ointments/creams). Do not scrub incision site. Pat dry after shower.     PRINCIPAL DISCHARGE DIAGNOSIS  Diagnosis: Primary osteoarthritis of right knee  Assessment and Plan of Treatment: Physical Therapy/Occupational Therapy for: ambulation, transfers, stairs, ADL's (activities of daily living), and range of motion exercises  -Activity  • Weight Bearing as tolerated with rolling walker.  • Take short, frequent walks increasing the distance that you walk each day as tolerated.  • Change your position every hour to decrease pain and stiffness.  • Continue the exercises taught to you by your physical therapist.  • No driving until cleared by the doctor.  • No tub baths, hot tubs, or swimming pools until instructed by your doctor.  • Do not squat down on the floor.  • Do not kneel or twist your knee.  • Range of Motion Goals: Flexion= 120 degrees, Extension = 0 degrees  Ice & Elevate leg to decrease pain/swelling  Use Cold Packs over your surgical incision for the first 72 hours continuously after surgery, and then, as needed thereafter. Replace inserts every 4 hours. May place directly on the skin.   You have a SHERRY dressing. You may shower. Disconnect SHERRY battery prior to showering. Reconnect battery after showering and press orange button to resume SHERRY power. Remove SHERRY dressing on post-op day #7.  Keep incision clean. DO NOT APPLY ANYTHING to incision site (salves/ointments/creams). Do not scrub incision site. Pat dry after shower.  Your incision has sutures/stitches beneath the skin. There is nothing to be removed in the office. Please do no scrub incision or put creams/lotions on it until cleared by your surgeon. Soap and water may run over it. Do not submerge until cleared by surgeon. Pat incision dry after shower.     PRINCIPAL DISCHARGE DIAGNOSIS  Diagnosis: Primary osteoarthritis of right knee  Assessment and Plan of Treatment: Physical Therapy/Occupational Therapy for: ambulation, transfers, stairs, ADL's (activities of daily living), and range of motion exercises  -Activity  • Weight Bearing as tolerated with rolling walker.  • Take short, frequent walks increasing the distance that you walk each day as tolerated.  • Change your position every hour to decrease pain and stiffness.  • Continue the exercises taught to you by your physical therapist.  • No driving until cleared by the doctor.  • No tub baths, hot tubs, or swimming pools until instructed by your doctor.  • Do not squat down on the floor.  • Do not kneel or twist your knee.  • Range of Motion Goals: Flexion= 120 degrees, Extension = 0 degrees  Ice & Elevate leg to decrease pain/swelling  Use Cold Packs over your surgical incision for the first 72 hours continuously after surgery, and then, as needed thereafter. Replace inserts every 4 hours. May place directly on the skin.   You have a SHERRY dressing. You may shower. Disconnect SHERRY battery prior to showering. Reconnect battery after showering and press orange button to resume SHERRY power. Remove SHERRY dressing on post-op day #7.  Keep incision clean. DO NOT APPLY ANYTHING to incision site (salves/ointments/creams). Do not scrub incision site. Pat dry after shower.  Your incision has sutures/stitches beneath the skin. There is nothing to be removed in the office. Please do no scrub incision or put creams/lotions on it until cleared by your surgeon. Soap and water may run over it. Do not submerge until cleared by surgeon. Pat incision dry after shower.      SECONDARY DISCHARGE DIAGNOSES  Diagnosis: Obesity  Assessment and Plan of Treatment: ok to restart wellbutrin and metformin.  Do not take Naltraxone and opioid (Dilaudid) together.

## 2025-04-03 NOTE — PHYSICAL THERAPY INITIAL EVALUATION ADULT - ADDITIONAL COMMENTS
Pt lives with children in a private house, there is 1 threshold step and pt will remain on the main floor. Pt has a tub shower. PTA pt was independent with ADLs and ambulation. Pt owns a RW. Pt reports her mom will be staying to help her upon return home.

## 2025-04-04 ENCOUNTER — TRANSCRIPTION ENCOUNTER (OUTPATIENT)
Age: 50
End: 2025-04-04

## 2025-04-04 VITALS
RESPIRATION RATE: 17 BRPM | SYSTOLIC BLOOD PRESSURE: 139 MMHG | HEART RATE: 73 BPM | TEMPERATURE: 98 F | DIASTOLIC BLOOD PRESSURE: 84 MMHG | OXYGEN SATURATION: 96 %

## 2025-04-04 LAB
ANION GAP SERPL CALC-SCNC: 5 MMOL/L — SIGNIFICANT CHANGE UP (ref 5–17)
BUN SERPL-MCNC: 7 MG/DL — SIGNIFICANT CHANGE UP (ref 7–23)
CALCIUM SERPL-MCNC: 8.3 MG/DL — LOW (ref 8.4–10.5)
CHLORIDE SERPL-SCNC: 107 MMOL/L — SIGNIFICANT CHANGE UP (ref 96–108)
CO2 SERPL-SCNC: 30 MMOL/L — SIGNIFICANT CHANGE UP (ref 22–31)
CREAT SERPL-MCNC: 0.75 MG/DL — SIGNIFICANT CHANGE UP (ref 0.5–1.3)
EGFR: 99 ML/MIN/1.73M2 — SIGNIFICANT CHANGE UP
EGFR: 99 ML/MIN/1.73M2 — SIGNIFICANT CHANGE UP
FERRITIN SERPL-MCNC: 34 NG/ML — SIGNIFICANT CHANGE UP (ref 15–150)
GLUCOSE SERPL-MCNC: 115 MG/DL — HIGH (ref 70–99)
HCT VFR BLD CALC: 35.7 % — SIGNIFICANT CHANGE UP (ref 34.5–45)
HGB BLD-MCNC: 11.4 G/DL — LOW (ref 11.5–15.5)
IRON SATN MFR SERPL: 10 % — LOW (ref 14–50)
IRON SATN MFR SERPL: 35 UG/DL — SIGNIFICANT CHANGE UP (ref 30–160)
MCHC RBC-ENTMCNC: 31.1 PG — SIGNIFICANT CHANGE UP (ref 27–34)
MCHC RBC-ENTMCNC: 31.9 G/DL — LOW (ref 32–36)
MCV RBC AUTO: 97.3 FL — SIGNIFICANT CHANGE UP (ref 80–100)
NRBC BLD AUTO-RTO: 0 /100 WBCS — SIGNIFICANT CHANGE UP (ref 0–0)
PLATELET # BLD AUTO: 258 K/UL — SIGNIFICANT CHANGE UP (ref 150–400)
POTASSIUM SERPL-MCNC: 4.2 MMOL/L — SIGNIFICANT CHANGE UP (ref 3.5–5.3)
POTASSIUM SERPL-SCNC: 4.2 MMOL/L — SIGNIFICANT CHANGE UP (ref 3.5–5.3)
RBC # BLD: 3.67 M/UL — LOW (ref 3.8–5.2)
RBC # FLD: 13.8 % — SIGNIFICANT CHANGE UP (ref 10.3–14.5)
SODIUM SERPL-SCNC: 142 MMOL/L — SIGNIFICANT CHANGE UP (ref 135–145)
TIBC SERPL-MCNC: 362 UG/DL — SIGNIFICANT CHANGE UP (ref 220–430)
UIBC SERPL-MCNC: 327 UG/DL — SIGNIFICANT CHANGE UP (ref 110–370)
WBC # BLD: 12.52 K/UL — HIGH (ref 3.8–10.5)
WBC # FLD AUTO: 12.52 K/UL — HIGH (ref 3.8–10.5)

## 2025-04-04 PROCEDURE — C1776: CPT

## 2025-04-04 PROCEDURE — 97165 OT EVAL LOW COMPLEX 30 MIN: CPT

## 2025-04-04 PROCEDURE — 27447 TOTAL KNEE ARTHROPLASTY: CPT | Mod: RT

## 2025-04-04 PROCEDURE — 83540 ASSAY OF IRON: CPT

## 2025-04-04 PROCEDURE — 85027 COMPLETE CBC AUTOMATED: CPT

## 2025-04-04 PROCEDURE — 81025 URINE PREGNANCY TEST: CPT

## 2025-04-04 PROCEDURE — 97116 GAIT TRAINING THERAPY: CPT

## 2025-04-04 PROCEDURE — 82728 ASSAY OF FERRITIN: CPT

## 2025-04-04 PROCEDURE — 73560 X-RAY EXAM OF KNEE 1 OR 2: CPT

## 2025-04-04 PROCEDURE — C1713: CPT

## 2025-04-04 PROCEDURE — 97530 THERAPEUTIC ACTIVITIES: CPT

## 2025-04-04 PROCEDURE — 80048 BASIC METABOLIC PNL TOTAL CA: CPT

## 2025-04-04 PROCEDURE — 83550 IRON BINDING TEST: CPT

## 2025-04-04 PROCEDURE — 97161 PT EVAL LOW COMPLEX 20 MIN: CPT

## 2025-04-04 PROCEDURE — 97535 SELF CARE MNGMENT TRAINING: CPT

## 2025-04-04 PROCEDURE — 36415 COLL VENOUS BLD VENIPUNCTURE: CPT

## 2025-04-04 RX ORDER — MELOXICAM 15 MG/1
1 TABLET ORAL
Qty: 28 | Refills: 0
Start: 2025-04-04 | End: 2025-05-01

## 2025-04-04 RX ORDER — OMEPRAZOLE 20 MG/1
1 CAPSULE, DELAYED RELEASE ORAL
Qty: 28 | Refills: 0
Start: 2025-04-04 | End: 2025-05-01

## 2025-04-04 RX ORDER — IRON SUCROSE 20 MG/ML
100 INJECTION, SOLUTION INTRAVENOUS ONCE
Refills: 0 | Status: COMPLETED | OUTPATIENT
Start: 2025-04-04 | End: 2025-04-04

## 2025-04-04 RX ORDER — ACETAMINOPHEN 500 MG/5ML
2 LIQUID (ML) ORAL
Qty: 0 | Refills: 0 | DISCHARGE
Start: 2025-04-04

## 2025-04-04 RX ORDER — HYDROMORPHONE/SOD CHLOR,ISO/PF 2 MG/10 ML
1 SYRINGE (ML) INJECTION
Qty: 42 | Refills: 0
Start: 2025-04-04 | End: 2025-04-10

## 2025-04-04 RX ORDER — MELOXICAM 15 MG/1
1 TABLET ORAL
Refills: 0 | DISCHARGE

## 2025-04-04 RX ORDER — ASPIRIN 325 MG
1 TABLET ORAL
Qty: 56 | Refills: 0
Start: 2025-04-04 | End: 2025-05-01

## 2025-04-04 RX ORDER — NALTREXONE HYDROCHLORIDE 50 MG/1
1 TABLET, FILM COATED ORAL
Qty: 0 | Refills: 0 | DISCHARGE

## 2025-04-04 RX ADMIN — MELOXICAM 15 MILLIGRAM(S): 15 TABLET ORAL at 08:45

## 2025-04-04 RX ADMIN — MELOXICAM 15 MILLIGRAM(S): 15 TABLET ORAL at 09:45

## 2025-04-04 RX ADMIN — DEXAMETHASONE 101.6 MILLIGRAM(S): 0.5 TABLET ORAL at 05:41

## 2025-04-04 RX ADMIN — IRON SUCROSE 210 MILLIGRAM(S): 20 INJECTION, SOLUTION INTRAVENOUS at 10:54

## 2025-04-04 RX ADMIN — Medication 4 MILLIGRAM(S): at 10:40

## 2025-04-04 RX ADMIN — Medication 2 MILLIGRAM(S): at 03:28

## 2025-04-04 RX ADMIN — Medication 4 MILLIGRAM(S): at 13:53

## 2025-04-04 RX ADMIN — Medication 2 MILLIGRAM(S): at 02:56

## 2025-04-04 RX ADMIN — Medication 4 MILLIGRAM(S): at 15:00

## 2025-04-04 RX ADMIN — Medication 2 MILLIGRAM(S): at 07:25

## 2025-04-04 RX ADMIN — Medication 1000 MILLIGRAM(S): at 05:43

## 2025-04-04 RX ADMIN — Medication 4 MILLIGRAM(S): at 09:40

## 2025-04-04 RX ADMIN — Medication 40 MILLIGRAM(S): at 05:41

## 2025-04-04 RX ADMIN — Medication 100 MILLIGRAM(S): at 02:48

## 2025-04-04 RX ADMIN — Medication 2 MILLIGRAM(S): at 08:25

## 2025-04-04 RX ADMIN — Medication 1000 MILLIGRAM(S): at 05:41

## 2025-04-04 RX ADMIN — Medication 81 MILLIGRAM(S): at 05:41

## 2025-04-04 NOTE — DISCHARGE NOTE NURSING/CASE MANAGEMENT/SOCIAL WORK - NSSCNAMETXT_GEN_ALL_CORE
[facility] care agency [phone] will reach out to you within 24-72 hours of your discharge to schedule home care visit/eval appointment with you. Please call agency for any queries regarding home care services

## 2025-04-04 NOTE — PROGRESS NOTE ADULT - ASSESSMENT
49F s/p right TKR    OA right knee s/p TKR  Pain Management: acceptable- continue current care Tylenol ATC/Mobic ATC/ Oxycodone PRN  Continue PT/OT  DVT proph: [x  ] low risk - Aspirin    DC plan:  [x  ] Home with HC      Obesity  on mult meds to assist in weight loss  - can restart wellbutrin, metformin  - hold Naltrexone while on opioids, can resume when no longer requires oxy    Iron def anemia  - has been on oral iron which causes her significant constipation  - iron levels reviewed, still mild iron deficiency, will give IV dose prior to discharge.,    Detail Level: Detailed Quality 110: Preventive Care And Screening: Influenza Immunization: Influenza Immunization Administered during Influenza season

## 2025-04-04 NOTE — PROGRESS NOTE ADULT - SUBJECTIVE AND OBJECTIVE BOX
POD  #:  1  S/P  Right TKR                       SUBJECTIVE: Patient seen and examined. Ambulated with PT. Tolerating diet. Voiding.   Patient states her leg is a little swollen but she was told not to elevate it last night but a nurse.  Re-educated patient on the importance of elevation and ice in the anthony-operative period to help with her swelling. Specifically reviewed pillow placement and ideal elevation with 'toes above the nose" while laying flat.   Reported Pain Score = 3/10 at rest but yeison to 7/10 when leg was straightened. Tolerating all medications and pain is well controlled on current regimen. Patient responds well to pain medication and was given a dose after exam.   Denies: CP/SOB/N/V/Numbness/Tingling    OBJECTIVE:     Vital Signs Last 24 Hrs  T(C): 36.4 (04 Apr 2025 08:12), Max: 36.8 (03 Apr 2025 14:25)  T(F): 97.6 (04 Apr 2025 08:12), Max: 98.2 (03 Apr 2025 14:25)  HR: 73 (04 Apr 2025 08:12) (73 - 92)  BP: 139/84 (04 Apr 2025 08:12) (106/68 - 157/72)  RR: 17 (04 Apr 2025 08:12) (15 - 20)  SpO2: 96% (04 Apr 2025 08:12) (94% - 100%)    Parameters below as of 04 Apr 2025 08:12  Patient On (Oxygen Delivery Method): room air        Gen: AAOx3, NAD  Abd: soft, NT, ND  Right Knee:          SHERRY Dressing: clean/dry/intact , flashing green/ok, no erythema or discharge noted, Bulky dressing removed   Bilateral LEs:         Sensation:  intact to light touch          Motor exam:  5/5 dorsiflexion/plantarflexion/EHL          2+ DP pulses          calf supple, NT, swollen with 1+ edema but not tender.          SCDs in place    LABS:                        11.4   12.52 )-----------( 258      ( 04 Apr 2025 06:30 )             35.7     04-04    142  |  107  |  7   ----------------------------<  115[H]  4.2   |  30  |  0.75    Ca    8.3[L]      04 Apr 2025 06:30        Urinalysis Basic - ( 04 Apr 2025 06:30 )    Color: x / Appearance: x / SG: x / pH: x  Gluc: 115 mg/dL / Ketone: x  / Bili: x / Urobili: x   Blood: x / Protein: x / Nitrite: x   Leuk Esterase: x / RBC: x / WBC x   Sq Epi: x / Non Sq Epi: x / Bacteria: x        MEDICATIONS:  Anticoagulation:  aspirin enteric coated 81 milliGRAM(s) Oral every 12 hours      Pain medications:   acetaminophen     Tablet .. 1000 milliGRAM(s) Oral every 8 hours  HYDROmorphone   Tablet 2 milliGRAM(s) Oral every 3 hours PRN  HYDROmorphone   Tablet 4 milliGRAM(s) Oral every 3 hours PRN  HYDROmorphone  Injectable 0.5 milliGRAM(s) IV Push every 3 hours PRN  meloxicam 15 milliGRAM(s) Oral daily        A/P :49y Female, Patient stable  s/p Right TKR POD # 1  -    Pain control: Acetaminophen, Meloxicam, Dilaudid   -    Cryotherapy and Elevation of operative extremity to help with pain and swelling, re-educated patient on the importance of elevation and ice in the anthony-operative period to help with her swelling.   -    Medicine co-management  -    Incentive Spirometry  -    DVT ppx: Aspirin 81mg q 12 h   -    Weight bearing status: WBAT   -    Physical Therapy  -    Occupational Therapy  -    Discharge plan: home today pending PT, OT, and Medicine clearance 
Patient is a 49y old  Female who presents with a chief complaint of Right total knee replacement (03 Apr 2025 13:37)      INTERVAL HPI/OVERNIGHT EVENTS: feeling well, pain controlled.  feels ready to go home.     MEDICATIONS  (STANDING):  acetaminophen     Tablet .. 1000 milliGRAM(s) Oral every 8 hours  aspirin enteric coated 81 milliGRAM(s) Oral every 12 hours  iron sucrose IVPB 100 milliGRAM(s) IV Intermittent once  lactated ringers. 1000 milliLiter(s) (100 mL/Hr) IV Continuous <Continuous>  meloxicam 15 milliGRAM(s) Oral daily  pantoprazole    Tablet 40 milliGRAM(s) Oral before breakfast  polyethylene glycol 3350 17 Gram(s) Oral at bedtime  senna 2 Tablet(s) Oral at bedtime    MEDICATIONS  (PRN):  HYDROmorphone   Tablet 2 milliGRAM(s) Oral every 3 hours PRN Moderate Pain (4 - 6)  HYDROmorphone   Tablet 4 milliGRAM(s) Oral every 3 hours PRN Severe Pain (7 - 10)  HYDROmorphone  Injectable 0.5 milliGRAM(s) IV Push every 3 hours PRN Breakthrough pain  magnesium hydroxide Suspension 30 milliLiter(s) Oral daily PRN Constipation  ondansetron Injectable 4 milliGRAM(s) IV Push every 6 hours PRN Nausea and/or Vomiting      Allergies  No Known Allergies    REVIEW OF SYSTEMS:  CONSTITUTIONAL: No fever, weight loss, or fatigue  EYES: No eye pain, visual disturbances, or discharge  ENMT:  No difficulty hearing, tinnitus, vertigo; No sinus or throat pain  NECK: No pain or stiffness  BREASTS: No pain, masses, or nipple discharge  RESPIRATORY: No cough, wheezing, chills or hemoptysis; No shortness of breath  CARDIOVASCULAR: No chest pain, palpitations, or lightheadedness  GASTROINTESTINAL: No abdominal or epigastric pain. No nausea, vomiting, or hematemesis; No diarrhea or constipation. No melena or hematochezia.  GENITOURINARY: No dysuria, frequency, hematuria, or incontinence  NEUROLOGICAL: No headaches, vertigo, memory loss, loss of strength, numbness, or tremors  SKIN: No itching, burning, rashes, or lesions   LYMPH NODES: No enlarged glands  ENDOCRINE: No heat or cold intolerance; No hair loss; No polydipsia or polyuria  MUSCULOSKELETAL: No back pain  PSYCHIATRIC: No depression, anxiety, or mood swings  HEME/LYMPH: No easy bruising, or bleeding gums  ALLERGY AND IMMUNOLOGIC: No hives or eczema    Vital Signs Last 24 Hrs  T(C): 36.4 (04 Apr 2025 08:12), Max: 36.8 (03 Apr 2025 14:25)  T(F): 97.6 (04 Apr 2025 08:12), Max: 98.2 (03 Apr 2025 14:25)  HR: 73 (04 Apr 2025 08:12) (73 - 92)  BP: 139/84 (04 Apr 2025 08:12) (106/68 - 157/72)  BP(mean): --  RR: 17 (04 Apr 2025 08:12) (15 - 20)  SpO2: 96% (04 Apr 2025 08:12) (94% - 100%)    Parameters below as of 04 Apr 2025 08:12  Patient On (Oxygen Delivery Method): room air        PHYSICAL EXAM:  GENERAL: NAD, well-groomed, well-developed  HEAD:  Atraumatic, Normocephalic  EYES:  conjunctiva and sclera clear  ENMT: Moist mucous membranes  NECK: Supple, No JVD  NERVOUS SYSTEM:  Alert & Oriented X3, Good concentration; Bilateral LE mobile, sensation to light touch intact  CHEST/LUNG: Clear to auscultation bilaterally;   HEART: Regular rate and rhythm;  ABDOMEN: Soft, Nontender, Nondistended; Bowel sounds present  EXTREMITIES:  2+ Peripheral Pulses, No clubbing or cyanosis  LYMPH: No lymphadenopathy noted  SKIN: No rashes or lesions  INCISION:  Dressing dry and intact    LABS:                        11.4   12.52 )-----------( 258      ( 04 Apr 2025 06:30 )             35.7     04 Apr 2025 06:30    142    |  107    |  7      ----------------------------<  115    4.2     |  30     |  0.75     Ca    8.3        04 Apr 2025 06:30      Iron - Total Binding Capacity.: 362 ug/dL  % Saturation, Iron: 10 %  Iron Total: 35 ug/dL  Unsaturated Iron Binding Capacity: 327 ug/dL  Ferritin: 34 ng/mL (04.03.25 @ 16:00)       CAPILLARY BLOOD GLUCOSE          RADIOLOGY & ADDITIONAL TESTS:    Imaging Personally Reviewed:      [ ] Consultant(s) Notes Reviewed  [x] Care Discussed with Consultants/Other Providers:  Ortho PA- plan of care
Discharge medication calendar:  ASA EC 81mg q12h x 4 weeks  Omeprazole 20mg QAM x 4 weeks  Meloxicam 15mg QAM x 2-3 weeks  APAP 1000mg q8h x 2-3 weeks  Narcotic PRN  Docusate 100mg TID while taking narcotic  Miralax, Senna, or Bisacodyl PRN for treatment of constipation

## 2025-04-04 NOTE — OCCUPATIONAL THERAPY INITIAL EVALUATION ADULT - ADDITIONAL COMMENTS
Pt lives in a private home 1 TIMBO will stay on main floor upon d/c. +tub (pt reports she will sponge bath)   Pt owns a RW and RTS. Pt's mother will assist as needed.

## 2025-04-04 NOTE — DISCHARGE NOTE NURSING/CASE MANAGEMENT/SOCIAL WORK - PATIENT PORTAL LINK FT
You can access the FollowMyHealth Patient Portal offered by [facility] by registering at the following website: [email or web address]. By joining [employer]’s FollowMyHealth portal, you will also be able to view your health information using other applications (apps) compatible with our system.

## 2025-04-04 NOTE — CARE COORDINATION ASSESSMENT. - NSPASTMEDSURGHISTORY_GEN_ALL_CORE_FT
PAST MEDICAL & SURGICAL HISTORY:  Iron deficiency anemia      Osteoarthritis of right knee      H/O  section

## 2025-04-04 NOTE — DISCHARGE NOTE NURSING/CASE MANAGEMENT/SOCIAL WORK - NSDCVIVACCINE_GEN_ALL_CORE_FT
Tdap; 05-Aug-2013 19:30; Breanna Adams (RN); [employer]; [id number] (Exp. Date: 18-Oct-2015); IM; LArm; 0.5 cc;

## 2025-04-04 NOTE — DISCHARGE NOTE NURSING/CASE MANAGEMENT/SOCIAL WORK - FINANCIAL ASSISTANCE
[facility] provides services at a reduced cost to those who are determined to be eligible through [facility]’s financial assistance program. Information regarding [facility]’s financial assistance program can be found by going to [email or web address] or by calling [phone].

## 2025-04-04 NOTE — CARE COORDINATION ASSESSMENT. - NSDCPLANSERVICES_GEN_ALL_CORE
CM met with patient at bedside.  Patient. A&O x 4. Pt s/p  PROCEDURES: Total right knee replacement.   Pt  resting comfortably / Pt has no complaints at this time.  CM explained role of CM and availability to assist with discharge planning throughout stay.   Provided CM contact information and pt. verbalized understanding. Pt lives with children in a private house, there is 1 threshold step and pt will remain on the main floor. Pt has a tub shower. PTA pt was independent with ADLs and ambulation. Pt owns a RW. Pt reports her mom will be staying to help her upon return home.  Patient identified her  and mother  as her caregiver at home who will assist her during her recuperation and will transport her home and to MD appointments.   Pt. is agreeable with PT/OT's recommendations for d/c plan to home with HC/PT.  CM explained home care expectations, process, insurance provisions and home safety with good understanding.   Offered list of [facility] and pt. chose [facility] at home care agency.  Provided discharge resources folder. CM made referral accordingly.  Informed them of Anticipated  DC date for today 4/4/2025 and for SOC tomorrow 4/5/2025.   Pt verbalizing understanding and in agreement with d/c plans.    PCP: Dr Givens [phone]  Pharm: [facility] [phone]/Home Care

## 2025-04-04 NOTE — CARE COORDINATION ASSESSMENT. - NSCAREPROVIDERS_GEN_ALL_CORE_FT
CARE PROVIDERS:  Administration: Carissa Chahal  Administration: Soo Hamilton  Administration: Sushil López  Admitting: Willie Chery  Attending: Willie Chery  Case Management: Santaromana, Anna  Covering Team: Colin Barney  Food & Nutrition Services: Farideh Barraza  Nurse: Radha Diaz  Nurse: Silke Chambers  Nurse: Alirio Guzman  Occupational Therapy: Camilo Nolasco  Override: Silke Chambers  PCA/Nursing Assistant: Mariya Coreas  PCA/Nursing Assistant: Tamara Vaughn  Physical Therapy: Kalie Richard  Primary Team: Kalyani Ortiz  Primary Team: Parag Bay  Primary Team: Galilea Gloria  Primary Team: Yuliana Heredia  Research: Jaylen Hernandes  Student: Holland Gray  Team: [facility]  Hospitalists, Team  UR// Supp. Assoc.: Jennifer Rodriguez

## 2025-04-10 ENCOUNTER — TRANSCRIPTION ENCOUNTER (OUTPATIENT)
Age: 50
End: 2025-04-10

## 2025-04-10 DIAGNOSIS — Z96.652 PRESENCE OF LEFT ARTIFICIAL KNEE JOINT: ICD-10-CM

## 2025-04-10 PROBLEM — Z96.651 S/P TOTAL KNEE REPLACEMENT, RIGHT: Status: ACTIVE | Noted: 2025-04-10

## 2025-04-10 RX ORDER — HYDROMORPHONE HYDROCHLORIDE 2 MG/1
2 TABLET ORAL
Qty: 30 | Refills: 0 | Status: ACTIVE | COMMUNITY
Start: 2025-04-10 | End: 1900-01-01

## 2025-04-12 ENCOUNTER — NON-APPOINTMENT (OUTPATIENT)
Age: 50
End: 2025-04-12

## 2025-04-14 ENCOUNTER — APPOINTMENT (OUTPATIENT)
Dept: ORTHOPEDIC SURGERY | Facility: CLINIC | Age: 50
End: 2025-04-14
Payer: COMMERCIAL

## 2025-04-14 DIAGNOSIS — Z96.651 PRESENCE OF RIGHT ARTIFICIAL KNEE JOINT: ICD-10-CM

## 2025-04-14 PROCEDURE — 99024 POSTOP FOLLOW-UP VISIT: CPT

## 2025-04-14 PROCEDURE — 73562 X-RAY EXAM OF KNEE 3: CPT | Mod: RT

## 2025-04-16 ENCOUNTER — TRANSCRIPTION ENCOUNTER (OUTPATIENT)
Age: 50
End: 2025-04-16

## 2025-04-16 RX ORDER — OXYCODONE 5 MG/1
5 TABLET ORAL
Qty: 30 | Refills: 0 | Status: ACTIVE | COMMUNITY
Start: 2025-04-10 | End: 1900-01-01

## 2025-04-21 ENCOUNTER — TRANSCRIPTION ENCOUNTER (OUTPATIENT)
Age: 50
End: 2025-04-21

## 2025-04-23 ENCOUNTER — TRANSCRIPTION ENCOUNTER (OUTPATIENT)
Age: 50
End: 2025-04-23

## 2025-04-23 RX ORDER — TIZANIDINE 2 MG/1
2 TABLET ORAL
Qty: 15 | Refills: 0 | Status: ACTIVE | COMMUNITY
Start: 2025-04-23 | End: 1900-01-01

## 2025-04-28 ENCOUNTER — TRANSCRIPTION ENCOUNTER (OUTPATIENT)
Age: 50
End: 2025-04-28

## 2025-04-29 ENCOUNTER — TRANSCRIPTION ENCOUNTER (OUTPATIENT)
Age: 50
End: 2025-04-29

## 2025-04-29 RX ORDER — OXYCODONE 5 MG/1
5 TABLET ORAL
Qty: 30 | Refills: 0 | Status: ACTIVE | COMMUNITY
Start: 2025-04-29 | End: 1900-01-01

## 2025-05-05 ENCOUNTER — TRANSCRIPTION ENCOUNTER (OUTPATIENT)
Age: 50
End: 2025-05-05

## 2025-05-07 ENCOUNTER — TRANSCRIPTION ENCOUNTER (OUTPATIENT)
Age: 50
End: 2025-05-07

## 2025-05-08 ENCOUNTER — TRANSCRIPTION ENCOUNTER (OUTPATIENT)
Age: 50
End: 2025-05-08

## 2025-05-15 ENCOUNTER — RX RENEWAL (OUTPATIENT)
Age: 50
End: 2025-05-15

## 2025-05-21 ENCOUNTER — APPOINTMENT (OUTPATIENT)
Dept: ORTHOPEDIC SURGERY | Facility: CLINIC | Age: 50
End: 2025-05-21
Payer: COMMERCIAL

## 2025-05-21 DIAGNOSIS — Z96.651 PRESENCE OF RIGHT ARTIFICIAL KNEE JOINT: ICD-10-CM

## 2025-05-21 DIAGNOSIS — M17.12 UNILATERAL PRIMARY OSTEOARTHRITIS, LEFT KNEE: ICD-10-CM

## 2025-05-21 PROCEDURE — 73564 X-RAY EXAM KNEE 4 OR MORE: CPT | Mod: LT

## 2025-05-21 PROCEDURE — 99214 OFFICE O/P EST MOD 30 MIN: CPT | Mod: 24

## 2025-05-21 PROCEDURE — 73562 X-RAY EXAM OF KNEE 3: CPT | Mod: RT

## 2025-05-30 ENCOUNTER — RX RENEWAL (OUTPATIENT)
Age: 50
End: 2025-05-30

## 2025-06-27 ENCOUNTER — TRANSCRIPTION ENCOUNTER (OUTPATIENT)
Age: 50
End: 2025-06-27

## 2025-07-23 ENCOUNTER — TRANSCRIPTION ENCOUNTER (OUTPATIENT)
Age: 50
End: 2025-07-23

## 2025-09-10 ENCOUNTER — TRANSCRIPTION ENCOUNTER (OUTPATIENT)
Age: 50
End: 2025-09-10

## (undated) DEVICE — SAW BLADE STRYKER SAGITTAL 12.5X89.5X0.89MM

## (undated) DEVICE — ELCTR STRYKER NEPTUNE SMOKE EVACUATION PENCIL (GREEN)

## (undated) DEVICE — GLV 8 PROTEXIS (BLUE)

## (undated) DEVICE — SYM-TOURNIQUET 3013LAAE: Type: DURABLE MEDICAL EQUIPMENT

## (undated) DEVICE — NDL SPINAL 18G X 3.5" (PINK)

## (undated) DEVICE — SUT MAXON 1 27" T-12

## (undated) DEVICE — FOLEY TRAY 16FR SURESTEP LTX BG

## (undated) DEVICE — VENODYNE/SCD SLEEVE CALF MEDIUM

## (undated) DEVICE — SUT MONOCRYL 3-0 27" PS-2 UNDYED

## (undated) DEVICE — HOOD FLYTE STRYKER HELMET SHIELD

## (undated) DEVICE — SUT MONOSOF 3-0 18" C-14

## (undated) DEVICE — SUT POLYSORB 2-0 30" GS-11 UNDYED

## (undated) DEVICE — DRSG DERMABOND PRINEO 22CM

## (undated) DEVICE — STRYKER PULSE LAVAGE WITH COAXIAL MULTI-ORIFICE TIP

## (undated) DEVICE — WARMING BLANKET UPPER ADULT

## (undated) DEVICE — SUT PDS II 1 27" CT

## (undated) DEVICE — WOUND IRR IRRISEPT W 0.5 CHG

## (undated) DEVICE — DRILL BIT BIOMET QUICK CONNECT 1/8

## (undated) DEVICE — GLV 7.5 PROTEXIS (WHITE)

## (undated) DEVICE — BRACE KNEE IMMOBILIZER SPLINT SUPER LARGE 20"

## (undated) DEVICE — SOL IRR POUR NS 0.9% 1000ML

## (undated) DEVICE — SUT MONOSOF 3-0 18" P-12

## (undated) DEVICE — DRAPE 3/4 SHEET 52X76"

## (undated) DEVICE — DRSG COMBINE 5 X 9"

## (undated) DEVICE — HOOD T5 PEELAWAY

## (undated) DEVICE — SUT POLYSORB 0 36" GS-11 UNDYED

## (undated) DEVICE — PACK TOTAL KNEE

## (undated) DEVICE — SUT POLYSORB 1-0 30" GS-12 UNDYED

## (undated) DEVICE — DRAPE INSTRUMENT POUCH 6.75" X 11"

## (undated) DEVICE — SOL IRR POUR H2O 1000ML

## (undated) DEVICE — ELCTR AQUAMANTYS BIPOLAR SEALER 6.0

## (undated) DEVICE — SAW BLADE STRYKER SAGITTAL 3 HOLE OSCILLATING

## (undated) DEVICE — SOL IRR BAG NS 0.9% 3000ML